# Patient Record
Sex: MALE | Race: WHITE | Employment: FULL TIME | URBAN - METROPOLITAN AREA
[De-identification: names, ages, dates, MRNs, and addresses within clinical notes are randomized per-mention and may not be internally consistent; named-entity substitution may affect disease eponyms.]

---

## 2019-10-07 ENCOUNTER — APPOINTMENT (OUTPATIENT)
Dept: GENERAL RADIOLOGY | Facility: HOSPITAL | Age: 24
End: 2019-10-07
Attending: PHYSICIAN ASSISTANT
Payer: COMMERCIAL

## 2019-10-07 ENCOUNTER — HOSPITAL ENCOUNTER (EMERGENCY)
Facility: HOSPITAL | Age: 24
Discharge: HOME | End: 2019-10-07
Attending: EMERGENCY MEDICINE
Payer: COMMERCIAL

## 2019-10-07 VITALS
OXYGEN SATURATION: 98 % | WEIGHT: 143 LBS | BODY MASS INDEX: 19.37 KG/M2 | DIASTOLIC BLOOD PRESSURE: 61 MMHG | SYSTOLIC BLOOD PRESSURE: 132 MMHG | TEMPERATURE: 98 F | HEIGHT: 72 IN | HEART RATE: 61 BPM | RESPIRATION RATE: 17 BRPM

## 2019-10-07 DIAGNOSIS — R55 SYNCOPE, NEAR: ICD-10-CM

## 2019-10-07 DIAGNOSIS — R42 DIZZINESS: Primary | ICD-10-CM

## 2019-10-07 PROCEDURE — 99284 EMERGENCY DEPT VISIT MOD MDM: CPT

## 2019-10-07 PROCEDURE — 93005 ELECTROCARDIOGRAM TRACING: CPT

## 2019-10-07 PROCEDURE — 85025 COMPLETE CBC W/AUTO DIFF WBC: CPT | Performed by: EMERGENCY MEDICINE

## 2019-10-07 PROCEDURE — 93010 ELECTROCARDIOGRAM REPORT: CPT | Performed by: PHYSICIAN ASSISTANT

## 2019-10-07 PROCEDURE — 84484 ASSAY OF TROPONIN QUANT: CPT | Performed by: PHYSICIAN ASSISTANT

## 2019-10-07 PROCEDURE — 80053 COMPREHEN METABOLIC PANEL: CPT | Performed by: EMERGENCY MEDICINE

## 2019-10-07 PROCEDURE — 85025 COMPLETE CBC W/AUTO DIFF WBC: CPT

## 2019-10-07 PROCEDURE — 99285 EMERGENCY DEPT VISIT HI MDM: CPT

## 2019-10-07 PROCEDURE — 71046 X-RAY EXAM CHEST 2 VIEWS: CPT | Performed by: PHYSICIAN ASSISTANT

## 2019-10-07 PROCEDURE — 80053 COMPREHEN METABOLIC PANEL: CPT

## 2019-10-07 PROCEDURE — 96360 HYDRATION IV INFUSION INIT: CPT

## 2019-10-07 RX ORDER — SODIUM CHLORIDE 9 MG/ML
INJECTION, SOLUTION INTRAVENOUS ONCE
Status: COMPLETED | OUTPATIENT
Start: 2019-10-07 | End: 2019-10-07

## 2019-10-07 NOTE — ED INITIAL ASSESSMENT (HPI)
Pt presents to the ED with complaints of feeling lightheaded today with tingling while at work. Pt did have a syncopal episode last week, seen by a doctor last week and told all ok. Pt states symptoms now have subsided.  Pt c/o feeling slightly shaky, but n

## 2019-10-07 NOTE — ED PROVIDER NOTES
Patient Seen in: [facility] Emergency Department      History   Patient presents with:  Dizziness (neurologic)    Stated Complaint: near syncope    HPI    CHIEF COMPLAINT: Dizziness and lightheaded feeling    HISTORY OF PRESENT ILLNESS: It is a 24-y above.    History reviewed. No pertinent past medical history. History reviewed. No pertinent surgical history.                  Social History    Tobacco Use      Smoking status: Never Smoker      Smokeless tobacco: Never Used    Alcohol use: Not agitation    ED Course     Labs Reviewed   COMP METABOLIC PANEL (14) - Normal   TROPONIN I - Normal   CBC WITH DIFFERENTIAL WITH PLATELET    Narrative: The following orders were created for panel order CBC WITH DIFFERENTIAL WITH PLATELET.   Procedure written discharge and follow-up instructions were provided to help prevent relapse or worsening. I discussed the case with the patient and they had no questions, complaints, or concerns.   Patient states they understand diagnosis, followup plan and agree

## 2019-10-10 PROBLEM — K21.9 GERD (GASTROESOPHAGEAL REFLUX DISEASE): Status: ACTIVE | Noted: 2019-10-10

## 2019-10-10 PROBLEM — R07.9 CHEST PAIN: Status: ACTIVE | Noted: 2019-10-10

## 2022-02-02 ENCOUNTER — HOSPITAL ENCOUNTER (EMERGENCY)
Facility: HOSPITAL | Age: 27
Discharge: HOME | End: 2022-02-02
Attending: EMERGENCY MEDICINE
Payer: OTHER MISCELLANEOUS

## 2022-02-02 VITALS
OXYGEN SATURATION: 99 % | SYSTOLIC BLOOD PRESSURE: 142 MMHG | HEART RATE: 56 BPM | TEMPERATURE: 98 F | RESPIRATION RATE: 17 BRPM | DIASTOLIC BLOOD PRESSURE: 78 MMHG

## 2022-02-02 DIAGNOSIS — S01.81XA FACIAL LACERATION, INITIAL ENCOUNTER: Primary | ICD-10-CM

## 2022-02-02 PROCEDURE — 12011 RPR F/E/E/N/L/M 2.5 CM/<: CPT

## 2022-02-02 PROCEDURE — 99283 EMERGENCY DEPT VISIT LOW MDM: CPT

## 2022-02-02 PROCEDURE — 90471 IMMUNIZATION ADMIN: CPT

## 2022-02-02 NOTE — ED INITIAL ASSESSMENT (HPI)
Patient in states he was at work pulling down cardboard box and hit him in face. Small lac to right side of face on right side of nose. Unknown last tetanus.

## 2022-02-02 NOTE — ED PROVIDER NOTES
Patient Seen in: [facility] Emergency Department      History   Patient presents with:  Laceration/Abrasion    Stated Complaint: laceration to face, bleeding controlled    Subjective:   HPI    44-year-old male presents emergency room with chief complaint of laceration to the face. Patient states he was at work this morning, was taking a box off a shelf when the box fell and struck him in the right side of the face/lip area. Patient suffered a laceration. Denies any loss of conscious. Denies any dental trauma. Denies headache or neck pain. Denies nausea or vomiting. Last tetanus shot unknown. Objective:   History reviewed. No pertinent past medical history. History reviewed. No pertinent surgical history. Social History    Tobacco Use      Smoking status: Never Smoker      Smokeless tobacco: Never Used    Alcohol use: Not on file    Drug use: Not on file             Review of Systems    Positive for stated complaint: laceration to face, bleeding controlled  Other systems are as noted in HPI. Constitutional and vital signs reviewed. All other systems reviewed and negative except as noted above. Physical Exam     ED Triage Vitals [02/02/22 1134]   /78   Pulse 56   Resp    Temp    Temp src    SpO2 99 %   O2 Device        Current:/78   Pulse 56   SpO2 99%         Physical Exam    GENERAL: Patient is awake, alert, well-appearing, in no acute distress. HEENT:  no scleral icterus. Mucous membranes are moist, oropharynx is clear, uvula midline. No dental trauma. No facial bone tenderness or step-off. There is a 1.5 cm slightly irregular laceration just lateral to the right nares and extends to the upper lip without involvement of the vermilion border. Laceration is not through and through. Scalp is atraumatic. NECK: No midline cervical spine tenderness or step-off. HEART: Regular rate and rhythm, no murmurs. LUNGS: Clear to auscultation bilaterally.   No Rales, no rhonchi, no wheezing, no stridor. NEUROLOGIC EXAM: Tongue midline, no facial drooping, no ptosis    ED Course   Labs Reviewed - No data to display       Laceration was anesthetized with lidocaine with epinephrine locally. The wound was cleansed and irrigated copiously. There was no visible foreign body, vessel, nerve, bone , joint space or tendon within the wound. The wound was closed using a simple closure with 6-0 nylon. The quality of the closure was excellent. MDM      Patient wound thoroughly cleansed, suture repair was performed by myself. Patient did receive tetanus update. I discussed wound care including wound check in 48 hours with suture removal 5-7 days. Return to ER if any change worsening symptoms. Patient is well-appearing and was discharged good condition. Disposition and Plan     Clinical Impression:  Facial laceration, initial encounter  (primary encounter diagnosis)     Disposition:  Discharge  2/2/2022 11:52 am    Follow-up:  Kwame Ramesh, [address] [id number]    In 2 days            Medications Prescribed:  There are no discharge medications for this patient.

## 2022-02-04 PROBLEM — S93.621A SPRAIN OF LIGAMENT OF TARSOMETATARSAL JOINT OF RIGHT FOOT: Status: ACTIVE | Noted: 2021-05-12

## 2022-02-04 PROBLEM — S62.640D: Status: ACTIVE | Noted: 2017-07-11

## 2022-02-04 PROBLEM — S92.254D: Status: ACTIVE | Noted: 2021-05-12

## (undated) NOTE — ED AVS SNAPSHOT
Mr. Amado Springer   MRN: [id number]    Department:  [facility] Emergency Department   Date of Visit:  10/7/2019           Disclosure     Insurance plans vary and the physician(s) referred by the ER may not be covered by your plan.  Please con tell this physician (or your personal doctor if your instructions are to return to your personal doctor) about any new or lasting problems. The primary care or specialist physician will see patients referred from the [facility] Emergency Department.  Cornelio Huitron